# Patient Record
Sex: FEMALE | Race: BLACK OR AFRICAN AMERICAN | Employment: FULL TIME | URBAN - METROPOLITAN AREA
[De-identification: names, ages, dates, MRNs, and addresses within clinical notes are randomized per-mention and may not be internally consistent; named-entity substitution may affect disease eponyms.]

---

## 2023-04-13 ENCOUNTER — APPOINTMENT (OUTPATIENT)
Dept: GENERAL RADIOLOGY | Age: 38
End: 2023-04-13
Attending: EMERGENCY MEDICINE
Payer: COMMERCIAL

## 2023-04-13 ENCOUNTER — HOSPITAL ENCOUNTER (EMERGENCY)
Age: 38
Discharge: HOME | End: 2023-04-14
Attending: EMERGENCY MEDICINE
Payer: COMMERCIAL

## 2023-04-13 VITALS
RESPIRATION RATE: 16 BRPM | WEIGHT: 160.94 LBS | HEIGHT: 64 IN | HEART RATE: 113 BPM | DIASTOLIC BLOOD PRESSURE: 76 MMHG | OXYGEN SATURATION: 97 % | BODY MASS INDEX: 27.48 KG/M2 | SYSTOLIC BLOOD PRESSURE: 122 MMHG | TEMPERATURE: 98.8 F

## 2023-04-13 DIAGNOSIS — M77.9 TENDINITIS: ICD-10-CM

## 2023-04-13 DIAGNOSIS — M79.671 PAIN OF RIGHT HEEL: ICD-10-CM

## 2023-04-13 DIAGNOSIS — M25.571 ACUTE RIGHT ANKLE PAIN: Primary | ICD-10-CM

## 2023-04-13 PROCEDURE — 99283 EMERGENCY DEPT VISIT LOW MDM: CPT

## 2023-04-13 PROCEDURE — 73610 X-RAY EXAM OF ANKLE: CPT

## 2023-04-13 PROCEDURE — 74011250637 HC RX REV CODE- 250/637: Performed by: EMERGENCY MEDICINE

## 2023-04-13 RX ORDER — ACETAMINOPHEN 500 MG
1000 TABLET ORAL
Status: COMPLETED | OUTPATIENT
Start: 2023-04-13 | End: 2023-04-13

## 2023-04-13 RX ADMIN — ACETAMINOPHEN 1000 MG: 500 TABLET ORAL at 22:40

## 2023-04-14 NOTE — ED PROVIDER NOTES
Deejay Del Real is a 40 y.o. female who presents to the ED complaining of acute right posterior ankle pain. A little sore  for a couple weeks while practicing kickball, so has been taking it easy. Tonight, ran from first to second base and when she took off, heard a pop and had severe posterior ankle pain. No other injury or complaints. History reviewed. No pertinent past medical history. History reviewed. No pertinent surgical history. History reviewed. No pertinent family history. Social History     Socioeconomic History    Marital status:      Spouse name: Not on file    Number of children: Not on file    Years of education: Not on file    Highest education level: Not on file   Occupational History    Not on file   Tobacco Use    Smoking status: Never    Smokeless tobacco: Never   Substance and Sexual Activity    Alcohol use: Not Currently    Drug use: Never    Sexual activity: Not on file   Other Topics Concern    Not on file   Social History Narrative    Not on file     Social Determinants of Health     Financial Resource Strain: Not on file   Food Insecurity: Not on file   Transportation Needs: Not on file   Physical Activity: Not on file   Stress: Not on file   Social Connections: Not on file   Intimate Partner Violence: Not on file   Housing Stability: Not on file         ALLERGIES: Clindamycin and Flagyl [metronidazole]    Review of Systems  See HPI for relevant review of systems. Vitals:    04/13/23 2129 04/13/23 2304   BP: (!) 130/97 122/76   Pulse: (!) 119 (!) 113   Resp: 15 16   Temp: 98.8 °F (37.1 °C)    SpO2: 99% 97%   Weight: 73 kg (160 lb 15 oz)    Height: 5' 4\" (1.626 m)             Physical Exam  Vitals and nursing note reviewed. Constitutional:       General: She is not in acute distress. HENT:      Head: Normocephalic and atraumatic. Cardiovascular:      Rate and Rhythm: Normal rate and regular rhythm. Pulmonary:      Effort: No respiratory distress. Musculoskeletal:      Right upper leg: Normal.      Left upper leg: Normal.      Right knee: Normal.      Left knee: Normal.      Right ankle: No deformity or ecchymosis. No tenderness. Anterior drawer test negative. Normal pulse. Right Achilles Tendon: Tenderness (over achilles tendon. pain worse with dorsiflexion of ankle) present. Wolf's test negative (distal calf/proximal achilles tendon tenderness, but intact platar flexon). Comments: sensation intact to light touch   Brisk capillary refill   Neurological:      General: No focal deficit present. Mental Status: She is alert. Medical Decision Making  Amount and/or Complexity of Data Reviewed  Radiology: ordered. Risk  OTC drugs. Vital Signs: I independently reviewed the patients vital signs, which were notable for tachycardia, borderline hypertension, otherwise within normal limits and stable. Pulse Oximetry Interpretation:  Pulse Oximetry  O2 source: room air  O2 Sat: 97%  Interpretation: Normal per Micheal Montes MD    Nursing Notes: I independently reviewed and utilized the nursing notes. Old Medical Records: I independently reviewed the patients available external past medical records and past encounters. Differential Diagnoses: My differential diagnosis considered included, but was not limited to:   Not acute achilles tendon rupture, as active plantarflexion is intact and painless  Achilles tendinitis/tendinopathy likely as pain with dorsiflexion  No fracture, dislocation, effusion  Doubt deep vein thrombosis    ED Course/Updates:  I independently ordered, and the patient was given:  Medications   acetaminophen (TYLENOL) tablet 1,000 mg (1,000 mg Oral Given 4/13/23 2240)        Walking boot  Diagnostic laboratory and/or imaging tests were ordered, reviewed and independently interpreted by me, as above.   I discussed with the patient the ED findings and plan for discharge, follow up with ortho/podiatry, advised acetaminophen (Tylenol®), ibuprofen (Motrin®, Advil®, etc) , rest, ice, compression, elevation, and with instructions to return to the ED for any new or worsening symptoms. The patient verbalized understanding and agreement with the plan and all questions were answered. Clinical Impression(s):  1. Acute right ankle pain    2. Pain of right heel    3. Tendinitis        Disposition:  Discharged  -------------------------------------------------------------------   Dictation software may have been used to aid in this documentation; dictation errors may exist as a result.       Procedures

## 2023-04-14 NOTE — ED PROVIDER NOTES
Megan Meyer is a 40 y.o. female who presents to the ED complaining of acute right posterior ankle pain. A little sore  for a couple weeks while practicing kickball, so has been taking it easy. Tonight, ran from first to second base and when she took off, heard a pop and had severe posterior ankle pain. No other injury or complaints. History reviewed. No pertinent past medical history. History reviewed. No pertinent surgical history. History reviewed. No pertinent family history. Social History     Socioeconomic History    Marital status:      Spouse name: Not on file    Number of children: Not on file    Years of education: Not on file    Highest education level: Not on file   Occupational History    Not on file   Tobacco Use    Smoking status: Never    Smokeless tobacco: Never   Substance and Sexual Activity    Alcohol use: Not Currently    Drug use: Never    Sexual activity: Not on file   Other Topics Concern    Not on file   Social History Narrative    Not on file     Social Determinants of Health     Financial Resource Strain: Not on file   Food Insecurity: Not on file   Transportation Needs: Not on file   Physical Activity: Not on file   Stress: Not on file   Social Connections: Not on file   Intimate Partner Violence: Not on file   Housing Stability: Not on file         ALLERGIES: Clindamycin and Flagyl [metronidazole]    Review of Systems  See HPI for relevant review of systems. Vitals:    04/13/23 2129 04/13/23 2304   BP: (!) 130/97 122/76   Pulse: (!) 119 (!) 113   Resp: 15 16   Temp: 98.8 °F (37.1 °C)    SpO2: 99% 97%   Weight: 73 kg (160 lb 15 oz)    Height: 5' 4\" (1.626 m)             Physical Exam  Vitals and nursing note reviewed. Constitutional:       General: She is not in acute distress. HENT:      Head: Normocephalic and atraumatic. Cardiovascular:      Rate and Rhythm: Normal rate and regular rhythm. Pulmonary:      Effort: No respiratory distress. Musculoskeletal:      Right upper leg: Normal.      Left upper leg: Normal.      Right knee: Normal.      Left knee: Normal.      Right ankle: No deformity or ecchymosis. No tenderness. Anterior drawer test negative. Normal pulse. Right Achilles Tendon: Tenderness (over achilles tendon. pain worse with dorsiflexion of ankle) present. Wolf's test negative (distal calf/proximal achilles tendon tenderness, but intact platar flexon). Comments: sensation intact to light touch   Brisk capillary refill   Neurological:      General: No focal deficit present. Mental Status: She is alert. Medical Decision Making  Amount and/or Complexity of Data Reviewed  Radiology: ordered. Risk  OTC drugs. Vital Signs: I independently reviewed the patients vital signs, which were notable for tachycardia, borderline hypertension, otherwise within normal limits and stable. Pulse Oximetry Interpretation:  Pulse Oximetry  O2 source: room air  O2 Sat: 97%  Interpretation: Normal per Jess Rodas MD    Nursing Notes: I independently reviewed and utilized the nursing notes. Old Medical Records: I independently reviewed the patients available external past medical records and past encounters. Differential Diagnoses: My differential diagnosis considered included, but was not limited to:   Not acute achilles tendon rupture, as active plantarflexion is intact and painless  Achilles tendinitis/tendinopathy likely as pain with dorsiflexion  No fracture, dislocation, effusion  Doubt deep vein thrombosis    ED Course/Updates:  I independently ordered, and the patient was given:  Medications   acetaminophen (TYLENOL) tablet 1,000 mg (1,000 mg Oral Given 4/13/23 2240)        Walking boot  Diagnostic laboratory and/or imaging tests were ordered, reviewed and independently interpreted by me, as above.   I discussed with the patient the ED findings and plan for discharge, follow up with ortho/podiatry, advised acetaminophen (Tylenol®), ibuprofen (Motrin®, Advil®, etc) , rest, ice, compression, elevation, and with instructions to return to the ED for any new or worsening symptoms. The patient verbalized understanding and agreement with the plan and all questions were answered. Clinical Impression(s):  1. Acute right ankle pain    2. Pain of right heel    3. Tendinitis        Disposition:  Discharged  -------------------------------------------------------------------   Dictation software may have been used to aid in this documentation; dictation errors may exist as a result.       Procedures

## 2023-04-14 NOTE — ED TRIAGE NOTES
Pt reports she was playing kickball at around 20:15 when she got hit on the lower back of the Rt leg and foot and felt something pop. Now reporting pain. 2+ DP pulse palpable. Swelling noted to Rt ankle. Sensation intact, movement decreased due to pain.

## 2025-08-12 ENCOUNTER — OFFICE VISIT (OUTPATIENT)
Age: 40
End: 2025-08-12

## 2025-08-12 VITALS
BODY MASS INDEX: 27.55 KG/M2 | WEIGHT: 161.4 LBS | SYSTOLIC BLOOD PRESSURE: 144 MMHG | TEMPERATURE: 99.6 F | DIASTOLIC BLOOD PRESSURE: 91 MMHG | RESPIRATION RATE: 18 BRPM | HEIGHT: 64 IN | HEART RATE: 100 BPM | OXYGEN SATURATION: 97 %

## 2025-08-12 DIAGNOSIS — N30.01 ACUTE CYSTITIS WITH HEMATURIA: ICD-10-CM

## 2025-08-12 DIAGNOSIS — N30.01 ACUTE CYSTITIS WITH HEMATURIA: Primary | ICD-10-CM

## 2025-08-12 PROBLEM — R03.0 ELEVATED BP WITHOUT DIAGNOSIS OF HYPERTENSION: Status: ACTIVE | Noted: 2021-10-18

## 2025-08-12 PROBLEM — A60.00 GENITAL HERPES SIMPLEX: Status: ACTIVE | Noted: 2019-01-08

## 2025-08-12 LAB
BILIRUBIN, URINE, POC: NEGATIVE
BLOOD URINE, POC: ABNORMAL
GLUCOSE URINE, POC: NEGATIVE
KETONES, URINE, POC: NEGATIVE
LEUKOCYTE ESTERASE, URINE, POC: ABNORMAL
NITRITE, URINE, POC: NEGATIVE
PH, URINE, POC: 8.5 (ref 4.6–8)
PROTEIN,URINE, POC: 30
SPECIFIC GRAVITY, URINE, POC: 1.01 (ref 1–1.03)
URINALYSIS CLARITY, POC: ABNORMAL
URINALYSIS COLOR, POC: ABNORMAL
UROBILINOGEN, POC: NORMAL MG/DL

## 2025-08-12 RX ORDER — NITROFURANTOIN 25; 75 MG/1; MG/1
100 CAPSULE ORAL 2 TIMES DAILY
Qty: 10 CAPSULE | Refills: 0 | Status: SHIPPED | OUTPATIENT
Start: 2025-08-12 | End: 2025-08-17

## 2025-08-13 LAB
BACTERIA SPEC CULT: NORMAL
CC UR VC: NORMAL
SERVICE CMNT-IMP: NORMAL